# Patient Record
Sex: FEMALE | Race: WHITE | Employment: FULL TIME | URBAN - METROPOLITAN AREA
[De-identification: names, ages, dates, MRNs, and addresses within clinical notes are randomized per-mention and may not be internally consistent; named-entity substitution may affect disease eponyms.]

---

## 2021-06-30 ENCOUNTER — TELEPHONE (OUTPATIENT)
Dept: INTERNAL MEDICINE CLINIC | Age: 56
End: 2021-06-30

## 2021-06-30 NOTE — TELEPHONE ENCOUNTER
----- Message from Blessing Rich sent at 2021  1:32 PM EDT -----  Subject: Appointment Request    Reason for Call: New Patient Request Appointment    QUESTIONS  Type of Appointment? Established Patient  Reason for appointment request? No appointments available during search  Additional Information for Provider? pt would like to schedule a new   pt/physical with Dr. Josiane Strickland. Says that she met her and has spoken with her   before. 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 She wants   a physical for March. She had one this year in March. Had an appt for new   pt with Dr. Josiane Strickland in March and had to cancel. Please call/advise.   ---------------------------------------------------------------------------  --------------  CALL BACK INFO  What is the best way for the office to contact you? OK to leave message on   voicemail  Preferred Call Back Phone Number? [phone]  ---------------------------------------------------------------------------  --------------  SCRIPT ANSWERS  Relationship to Patient? Self  Appointment reason? Establish Care/Find a provider  Is this the first appointment to establish care for a ? No  Have you been diagnosed with, awaiting test results for, or told that you   are suspected of having COVID-19 (Coronavirus)? (If patient has tested   negative or was tested as a requirement for work, school, or travel and   not based on symptoms, answer no)? No  Do you currently have flu-like symptoms including fever or chills, cough,   shortness of breath, difficulty breathing, or new loss of taste or smell? No  Have you had close contact with someone with COVID-19 in the last 14 days? No  (Service Expert - click yes below to proceed with [employer] As Usual   Scheduling)?  Yes

## 2021-06-30 NOTE — TELEPHONE ENCOUNTER
Happy to see her. Okay to schedule her as a new patient for physical.  Recommend last week in March if I understood correctly, and she 1 scheduled for March. .  Will be scheduling a week off in one of the first 3 weeks in March but not sure which yet.

## 2021-08-02 ENCOUNTER — TELEPHONE (OUTPATIENT)
Dept: INTERNAL MEDICINE CLINIC | Age: 56
End: 2021-08-02

## 2021-08-02 NOTE — TELEPHONE ENCOUNTER
----- Message from William Campuzanoo sent at 2021  8:56 AM EDT -----  Subject: Appointment Request    Reason for Call: New Patient Request Appointment    QUESTIONS  Type of Appointment? New Patient/New to Provider  Reason for appointment request? No appointments available during search  Additional Information for Provider? Pt is scheduled as new pt on 3/2/22   with Dr Sourav Berger but would like to be seen sooner for some concerns. She   thinks she's having some metabolism issues, and has hypothyroidism and pre   diabetic. Please advise   ---------------------------------------------------------------------------  --------------  CALL BACK INFO  What is the best way for the office to contact you? OK to leave message on   voicemail  Preferred Call Back Phone Number? [phone]  ---------------------------------------------------------------------------  --------------  SCRIPT ANSWERS  Relationship to Patient? Self  Have your symptoms changed? No  Is this the first appointment to establish care for a ? No  Have you been diagnosed with, awaiting test results for, or told that you   are suspected of having COVID-19 (Coronavirus)? (If patient has tested   negative or was tested as a requirement for work, school, or travel and   not based on symptoms, answer no)? No  Do you currently have flu-like symptoms including fever or chills, cough,   shortness of breath, difficulty breathing, or new loss of taste or smell? No  Have you had close contact with someone with COVID-19 in the last 14 days? No  (Service Expert  click yes below to proceed with [employer] As Usual   Scheduling)?  Yes

## 2021-08-03 NOTE — TELEPHONE ENCOUNTER
LVM for patient to call back. I Have scheduled her for 10/11/21 @ 9am.  Please confirm if this time is good and then cancel the 3/2/22 appointment.

## 2021-08-27 LAB
AVERAGE GLUCOSE: NORMAL
HBA1C MFR BLD: 5.8 %

## 2021-09-13 ENCOUNTER — OFFICE VISIT (OUTPATIENT)
Dept: INTERNAL MEDICINE CLINIC | Age: 56
End: 2021-09-13
Payer: COMMERCIAL

## 2021-09-13 VITALS
DIASTOLIC BLOOD PRESSURE: 64 MMHG | BODY MASS INDEX: 24.98 KG/M2 | OXYGEN SATURATION: 99 % | SYSTOLIC BLOOD PRESSURE: 120 MMHG | HEART RATE: 71 BPM | HEIGHT: 63 IN | WEIGHT: 141 LBS

## 2021-09-13 DIAGNOSIS — R73.03 PREDIABETES: Primary | ICD-10-CM

## 2021-09-13 DIAGNOSIS — E78.00 PURE HYPERCHOLESTEROLEMIA: ICD-10-CM

## 2021-09-13 DIAGNOSIS — R63.5 WEIGHT INCREASED: ICD-10-CM

## 2021-09-13 DIAGNOSIS — E03.9 ACQUIRED HYPOTHYROIDISM: ICD-10-CM

## 2021-09-13 PROBLEM — E78.5 HYPERLIPIDEMIA: Status: ACTIVE | Noted: 2021-09-13

## 2021-09-13 PROCEDURE — 99203 OFFICE O/P NEW LOW 30 MIN: CPT | Performed by: INTERNAL MEDICINE

## 2021-09-13 RX ORDER — LEVOTHYROXINE SODIUM 0.07 MG/1
TABLET ORAL
COMMUNITY
Start: 2021-08-16 | End: 2022-03-11 | Stop reason: SDUPTHER

## 2021-09-13 RX ORDER — ATORVASTATIN CALCIUM 20 MG/1
20 TABLET, FILM COATED ORAL
COMMUNITY
Start: 2021-05-27

## 2021-09-13 SDOH — ECONOMIC STABILITY: FOOD INSECURITY: WITHIN THE PAST 12 MONTHS, THE FOOD YOU BOUGHT JUST DIDN'T LAST AND YOU DIDN'T HAVE MONEY TO GET MORE.: NEVER TRUE

## 2021-09-13 SDOH — ECONOMIC STABILITY: FOOD INSECURITY: WITHIN THE PAST 12 MONTHS, YOU WORRIED THAT YOUR FOOD WOULD RUN OUT BEFORE YOU GOT MONEY TO BUY MORE.: NEVER TRUE

## 2021-09-13 ASSESSMENT — PATIENT HEALTH QUESTIONNAIRE - PHQ9
SUM OF ALL RESPONSES TO PHQ QUESTIONS 1-9: 0
SUM OF ALL RESPONSES TO PHQ QUESTIONS 1-9: 0
1. LITTLE INTEREST OR PLEASURE IN DOING THINGS: 0
SUM OF ALL RESPONSES TO PHQ9 QUESTIONS 1 & 2: 0
2. FEELING DOWN, DEPRESSED OR HOPELESS: 0
SUM OF ALL RESPONSES TO PHQ QUESTIONS 1-9: 0

## 2021-09-13 ASSESSMENT — SOCIAL DETERMINANTS OF HEALTH (SDOH): HOW HARD IS IT FOR YOU TO PAY FOR THE VERY BASICS LIKE FOOD, HOUSING, MEDICAL CARE, AND HEATING?: NOT HARD AT ALL

## 2021-09-13 NOTE — PROGRESS NOTES
9/13/2021    Mayra Nye  YOB: 1965    ASSESSMENT/PLAN:   Prediabetes  Assessment & Plan:  Has decreased A1c since initially diagnosed. Continue to work on lifestyle modification. Weight increased  Comments:  Still with normal BMI. Feels overwhelmed at the varied recommendations on diet for different medical conditions. Advised she follow Mediterranean style diet,   Acquired hypothyroidism  Assessment & Plan:  Managed by endocrinology, Dr. Casie Yanez, at [facility]. Is been changed back to branded Synthroid. Pure hypercholesterolemia  Assessment & Plan: On atorvastatin primarily based on family history. Started by Dr. Ermelinda Damon a couple years ago. Advised that I could prescribe her Lipitor, but she works with him and plans to continue to follow with him. Future Appointments   Date Time Provider Clarence Black   9/14/2021  3:00 PM Alcide Duverney, RD, LD JULIET RICHARDS   3/11/2022  9:00 AM MD JULIET Carpio [facility] [place]                CC:   Chief Complaint   Patient presents with    New Patient       HPI: 60 y.o. female here today to establish care and assess status of chronic medical problems. Transferring care from Dr. Roldan Schmitz. [place] like he was jus t\"keeping an on on  Things. Concerned about weight. Pelaton bike, walks and [place]. Goal weight is 133, and naked weight is 139#. Sees endocrine, now at [facility] Dr. Casie Yanez. Back and forth between generic and branded Synthroid. Endocrine changing back to branded    Has Susan mckeon from   This year, diagnosed with possibly early macular degeneration    Prediabetes dx this year. Think maybe from statin. Dad with aggressive Parkinson's. Ended up with feeding tube and trach due to stridor. Has recent cardiac evaluation for decreased exercise endurance. Ended up having EKG, stress test and monitor with normal results. Then saw Dr. Ermelinda Damon for cholesterol and opted to treat due to family history.      Past Medical History: Diagnosis Date    Acquired hypothyroidism 3/6/2015    Hyperlipidemia 9/13/2021    Scoliosis-s/p kristen rods 11/16/2013       History reviewed. No pertinent surgical history. Family Hx:      Problem Relation Age of Onset    High Cholesterol Mother     Hypertension Mother     Colon Polyps Mother     Other Father         parkinsons    Thyroid Disease Father     Colon Cancer Paternal Grandfather        Social History     Tobacco Use    Smoking status: Never Smoker    Smokeless tobacco: Never Used   Substance Use Topics    Alcohol use: Yes     Comment: occas       Review of Systems  As documented in HPI and patient questionnaire (scanned)    Current Outpatient Medications   Medication Sig Dispense Refill    atorvastatin (LIPITOR) 20 MG tablet Take 20 mg by mouth daily      levothyroxine (SYNTHROID) 75 MCG tablet Take 1 and 1/2 tablets by mouth on Sunday and take 1 tablet Monday thru Saturday.  CALCIUM PO Take by mouth      Multiple Vitamins-Minerals (PRESERVISION AREDS PO) Take by mouth 2 times daily      ascorbic acid (VITAMIN C) 1000 MG tablet Take 1,000 mg by mouth daily      vitamin D 25 MCG (1000 UT) CAPS Take 1,000 Units by mouth daily       No current facility-administered medications for this visit. Physical Exam  Vitals:    09/13/21 1050   BP: 120/64   Pulse: 71   SpO2: 99%   Weight: 141 lb (64 kg)   Height: 5' 3\" (1.6 m)     Body mass index is 24.98 kg/m². Physical Exam  Constitutional:       Appearance: Normal appearance. She is well-developed. HENT:      Head: Normocephalic and atraumatic. Right Ear: Tympanic membrane and ear canal normal.      Left Ear: Tympanic membrane and ear canal normal.   Neck:      Thyroid: No thyromegaly. Vascular: No carotid bruit. Trachea: No tracheal deviation. Cardiovascular:      Rate and Rhythm: Normal rate and regular rhythm. Pulses:           Carotid pulses are 2+ on the right side and 2+ on the left side. Heart sounds: Normal heart sounds. No murmur heard. Pulmonary:      Effort: Pulmonary effort is normal.      Breath sounds: Normal breath sounds. No wheezing or rales. Abdominal:      General: Bowel sounds are normal. There is no distension. Palpations: Abdomen is soft. There is no mass. Tenderness: There is no abdominal tenderness. Musculoskeletal:      Right lower leg: No edema. Left lower leg: No edema. Lymphadenopathy:      Cervical: No cervical adenopathy. Skin:     General: Skin is warm and dry. Neurological:      General: No focal deficit present. Mental Status: She is alert. This note was generated completely or in part utilizing Dragon dictation speech recognition software. Occasionally, words are mistranscribed and despite editing, the text may contain inaccuracies due to incorrect word recognition.   If further clarification is needed please contact the office at [phone]    --Wong Simpson MD

## 2021-09-13 NOTE — ASSESSMENT & PLAN NOTE
Managed by endocrinology, Dr. Mara Mendez, at [facility]. Is been changed back to branded Synthroid.

## 2021-09-13 NOTE — ASSESSMENT & PLAN NOTE
On atorvastatin primarily based on family history. Started by Dr. Mariely Olea a couple years ago. Advised that I could prescribe her Lipitor, but she works with him and plans to continue to follow with him.

## 2021-09-14 ENCOUNTER — OFFICE VISIT (OUTPATIENT)
Dept: INTERNAL MEDICINE CLINIC | Age: 56
End: 2021-09-14

## 2021-09-14 DIAGNOSIS — R73.03 PREDIABETES: Primary | ICD-10-CM

## 2021-09-14 NOTE — PROGRESS NOTES
Medical Nutrition Therapy    Jazzy Valero  September 14, 2021      Reason for visit PreDiabetes, weight concerns, nutrition questions  Patient Care Team:  Patel Graves MD as PCP - General (Internal Medicine)  Patel Graves MD as PCP - [facility] Provider  Elliott Campos MD (Cardiology)  Abdulkadir Shoemaker MD (Endocrinology)  Buddy Garrison MD as Consulting Physician (Obstetrics & Gynecology)  Kacie Amos      ASSESSMENT/PLAN:   NUTRITION DIAGNOSIS    #1 Problem: Altered Nutrition-Related Laboratory Values (NC-2.2)  Related to: Endocrine/Diabetes   As Evidenced by: Elevated Plasma glucose and/or HgbA1c levels           NUTRITION INTERVENTION  Nutrition Prescription: Plan meals to follow Plate Guide, including 1/2 plate vegetables, 1/4 plate protein, and 1/4 plate starchy foods. Interventions:  Plate Planner  Mediterranean Diet Nutrition Tx (AND Nutrition Care Manual)    OUTCOME/INDICATORS TO EVALUATE:     Patient Instructions   BEHAVIOR GOALS    What to eat:   1) Plan meals to follow Plate Guide, with 1/2 plate vegetables, 1/4 plate protein, and 1/4 plate starch. 2) Include whole grains, fruit with edible skin and seeds, and yogurt    How much to eat: Pay attention to hunger - eat just to point of satisfaction. Physical Activity:Aim for activity on all or most days of the week, even if just a short bout.                        Patient Active Problem List   Diagnosis    Acquired hypothyroidism    Hyperlipidemia    Scoliosis-s/p rkisten rods    Prediabetes       Current Outpatient Medications   Medication Sig Dispense Refill    CALCIUM PO Take by mouth      Multiple Vitamins-Minerals (PRESERVISION AREDS PO) Take by mouth 2 times daily      ascorbic acid (VITAMIN C) 1000 MG tablet Take 1,000 mg by mouth daily      atorvastatin (LIPITOR) 20 MG tablet Take 20 mg by mouth daily      vitamin D 25 MCG (1000 UT) CAPS Take 1,000 Units by mouth daily      levothyroxine (SYNTHROID) 75 MCG tablet Take 1 and 1/2 tablets by mouth on Sunday and take 1 tablet Monday thru Saturday. No current facility-administered medications for this visit. NUTRITION ASSESSMENT    Biochemical Data, Medical Tests and Procedures: Recent A1c 5.8     Lab Results   Component Value Date    LABA1C 6.1 (H) 06/05/2020     No results found for: EAG    Lab Results   Component Value Date    CHOL 153 06/05/2020    CHOL 147 05/20/2019     Lab Results   Component Value Date    TRIG 72 06/05/2020    TRIG 52 05/20/2019     Lab Results   Component Value Date    HDL 67 06/05/2020    HDL 64 05/20/2019     Lab Results   Component Value Date    LDLCALC 72 06/05/2020    [address] 73 05/20/2019     No results found for: LABVLDL, VLDL  No results found for: CHOLHDLRATIO    No results found for: WBC, HGB, HCT, MCV, PLT  No results found for: CREATININE, BUN, NA, K, CL, CO2    Anthropometric Measurements: Wt Readings from Last 3 Encounters:   09/13/21 141 lb (64 kg)      BMI Readings from Last 3 Encounters:   09/13/21 24.98 kg/m²   weight down 6 lb since March - weighs self daily  Patient's stated goal weight: 133 lb  7% Weight loss goal weight: 131 lb    Physical Activity Assessment:  Physical activity: home work outs - walking, [employer] bike, barre (using oliverio)  Frequency of activity: 2 - 6 times per week  Duration of activity: 20 to 60 minutes  Obstacles to activity: doesn't go to gym since [place] - limited due to kristen bars    Sleep: not good quality - tossing and turning - 6 hours    Food and Nutrition History:   Number of people in household: 2 - pt does shopping and cooking  Frequency of Meals Eaten away from home:2/week on weekend  Food Availability Problems  Within the past 12 months, have you worried that your food would run out before you got money to buy more? No  Within the past 12 months, has the food you bought not lasted till the end of the month and you didn't have money to get more?  No  Beverage consumption: vitamin water, still water, waterloo drinks, diet coke, zevia  Alcohol consumption: 1 - 2 beers once on weekend  Usual Food consumption: trying to balance recommendations from endocrinologist, cardiologist, ophthalmologist    tends to indulge for emotional reasons - carpe bro  FOOD RECALL  Up at [facility] meal--Usual time: 8 - 8:30a  Today: [employer] protein drink on way to work   Different Day: egg bites with sausage (from [employer])  OR oatmeal with fruit - berries    Snack  Occasionally - yogurt or quest bar    Second meal--Usual time: 11:30 - noon  Recent: tuna, Kaye crackers  Different Day: salmon and broccolini, tomatoes, few square of potatoes  (leftovers from [employer])  Salad with chicken and fruit or cauliflower coated chicken nuggest    Snack  sometimes    Third meal--Usual time: 5 - 7p  Recent: chicago style hot dog; edamame  Different Day: salmon or chicken or flank steak; either as salad or sandwich; adds veg    Snack  Sometimes - quest peanut butter cups or piece of jenae bar         Follow Up: one month    Referring Provider: Shanika Armas MD  Time spent with patient: 60 minutes

## 2021-09-14 NOTE — PATIENT INSTRUCTIONS
BEHAVIOR GOALS    What to eat:   1) Plan meals to follow Plate Guide, with 1/2 plate vegetables, 1/4 plate protein, and 1/4 plate starch. 2) Include whole grains, fruit with edible skin and seeds, and yogurt    How much to eat: Pay attention to hunger - eat just to point of satisfaction. Physical Activity:Aim for activity on all or most days of the week, even if just a short bout.

## 2022-03-07 LAB
ANION GAP SERPL CALCULATED.3IONS-SCNC: 6 MMOL/L (ref 3–16)
BUN BLDV-MCNC: 20 MG/DL (ref 7–25)
CALCIUM SERPL-MCNC: 9.1 MG/DL (ref 8.6–10.3)
CHLORIDE BLD-SCNC: 107 MMOL/L (ref 98–110)
CHOLESTEROL, TOTAL: 171 MG/DL (ref 0–200)
CO2: 29 MMOL/L (ref 21–33)
CREAT SERPL-MCNC: 0.72 MG/DL (ref 0.6–1.3)
GFR, ESTIMATED: >90
GLUCOSE BLD-MCNC: 106 MG/DL (ref 70–100)
HBA1C MFR BLD: 5.6 % (ref 4–5.6)
HDLC SERPL-MCNC: 64 MG/DL (ref 60–92)
HEPATITIS C ANTIBODY: NONREACTIVE
LDL CHOLESTEROL CALCULATED: 88 MG/DL
OSMOLALITY CALCULATION: 297 MOSM/KG (ref 278–305)
POTASSIUM SERPL-SCNC: 4.2 MMOL/L (ref 3.5–5.3)
SODIUM BLD-SCNC: 142 MMOL/L (ref 133–146)
TRIGL SERPL-MCNC: 94 MG/DL (ref 10–149)
TSH, 3RD GENERATION: 2.05 UIU/ML (ref 0.45–4.12)

## 2022-03-08 ENCOUNTER — TELEPHONE (OUTPATIENT)
Dept: INTERNAL MEDICINE CLINIC | Age: 57
End: 2022-03-08

## 2022-03-08 NOTE — TELEPHONE ENCOUNTER
Let her know her vitamin D was good last year. Doesn't need to be checked yearly. CBC is not considered medically necessary for yearly preventive check, so didn't order to avoid her possibly being charged for it. Happy to add it on to specimen in lab if she would like. Let me know.

## 2022-03-08 NOTE — TELEPHONE ENCOUNTER
Patient states she was surprised that a CBC and Vitamin D were not included in the labs Dr. Denice Howard wanted her to get. She wants to know if this was an oversight, or does Dr. Denice Howard not want those at this time. Should Dr. Denice Howard want these labs done please generate an order. Please contact patient @ phone # provided with Dr. Vasquez Sensing reply.

## 2022-03-08 NOTE — TELEPHONE ENCOUNTER
Patient called back and was given Dr Cruz Art message she says she is fine with that lab  not being ordered. She will be here at her next appointment.

## 2022-03-10 NOTE — PROGRESS NOTES
3/11/2022    Jack Shearer  YOB: 1965    ASSESSMENT/PLAN:   Wellness examination  Discussed age appropriate preventive care including healthy diet, daily exercise, immunizations and age & gender guided screening tests. Acquired hypothyroidism  Stable, continue current dose levothyroxine. Return in about 1 year (around 3/11/2023) for shingrix #2 in 2-6 mo, CPE. CC:   Chief Complaint   Patient presents with    Annual Exam     Physical     HPI:   Doing well no new concerns. Still struggles to try and keep weight down. Exercise: [employer] classes, walks dog and has pelaton, but inconsistent with use. Diet: trying more for Mediterranean diet. Sleep: about 6 hours nightly     Thyroid has been very stable, has seen endocr[facility]ology at [facility] but would be interested in having me take over since I had offered before. Past Medical History:   Diagnosis Date    Acquired hypothyroidism 3/6/2015    Hyperlipidemia 9/13/2021    Scoliosis-s/p kristen rods 11/16/2013     History reviewed. No pertinent surgical history. Family Hx:      Problem Relation Age of Onset    High Cholesterol Mother     Hypertension Mother     Colon Polyps Mother     Other Mother         SVT    Other Father         parkinsons    Thyroid Disease Father     Colon Cancer Paternal Grandfather        Social History     Tobacco Use    Smoking status: Never Smoker    Smokeless tobacco: Never Used   Substance Use Topics    Alcohol use: Yes     Comment: occas       Review of Systems   Constitutional: Negative. HENT: Negative. Eyes: Negative. Respiratory: Negative. Cardiovascular: Negative. Gastrointestinal: Negative. Genitourinary: Negative. Skin: Negative. Neurological: Negative. Psychiatric/Behavioral: Negative.         Current Outpatient Medications   Medication Sig Dispense Refill    Multiple Vitamins-Minerals (PRESERVISION AREDS 2) CAPS Take by mouth 2 times daily      levothyroxine (SYNTHROID) 75 MCG tablet Take 1 and 1/2 tablets by mouth on Sunday and take 1 tablet Monday thru Saturday. 100 tablet 3    CALCIUM PO Take by mouth      ascorbic acid (VITAMIN C) 1000 MG tablet Take 1,000 mg by mouth daily      atorvastatin (LIPITOR) 20 MG tablet Take 20 mg by mouth four times a week      vitamin D 25 MCG (1000 UT) CAPS Take 1,000 Units by mouth daily       No current facility-administered medications for this visit. Physical Exam  Vitals:    03/11/22 0853   BP: 124/78   Pulse: 78   Resp: 12   SpO2: 99%   Weight: 143 lb (64.9 kg)     Body mass index is 25.33 kg/m². Physical Exam  Constitutional:       Appearance: Normal appearance. She is well-developed. HENT:      Right Ear: Tympanic membrane, ear canal and external ear normal.      Left Ear: Tympanic membrane, ear canal and external ear normal.   Eyes:      Conjunctiva/sclera: Conjunctivae normal.      Pupils: Pupils are equal, round, and reactive to light. Neck:      Thyroid: No thyromegaly. Vascular: No carotid bruit. Trachea: No tracheal deviation. Cardiovascular:      Rate and Rhythm: Normal rate and regular rhythm. Pulses: Normal pulses. Carotid pulses are 2+ on the right side and 2+ on the left side. Heart sounds: Normal heart sounds. No murmur heard. Pulmonary:      Effort: Pulmonary effort is normal.      Breath sounds: Normal breath sounds. No wheezing or rales. Abdominal:      General: Bowel sounds are normal. There is no distension. Palpations: Abdomen is soft. There is no mass. Tenderness: There is no abdominal tenderness. Musculoskeletal:      Cervical back: Normal range of motion and neck supple. Right lower leg: No edema. Left lower leg: No edema. Lymphadenopathy:      Cervical: No cervical adenopathy. Skin:     General: Skin is warm and dry. Coloration: Skin is not pale.       Comments: No suspicious skin lesions   Neurological:      General: No focal deficit present. Mental Status: She is alert. Psychiatric:         Mood and Affect: Mood normal.       This note was generated completely or in part utilizing Dragon dictation speech recognition software. Occasionally, words are mistranscribed and despite editing, the text may contain inaccuracies due to incorrect word recognition.   If further clarification is needed please contact the office at [phone]    --Rafael Shultz MD

## 2022-03-11 ENCOUNTER — OFFICE VISIT (OUTPATIENT)
Dept: INTERNAL MEDICINE CLINIC | Age: 57
End: 2022-03-11
Payer: COMMERCIAL

## 2022-03-11 VITALS
RESPIRATION RATE: 12 BRPM | BODY MASS INDEX: 25.33 KG/M2 | DIASTOLIC BLOOD PRESSURE: 78 MMHG | HEART RATE: 78 BPM | WEIGHT: 143 LBS | OXYGEN SATURATION: 99 % | SYSTOLIC BLOOD PRESSURE: 124 MMHG

## 2022-03-11 DIAGNOSIS — Z00.00 WELLNESS EXAMINATION: Primary | ICD-10-CM

## 2022-03-11 DIAGNOSIS — E03.9 ACQUIRED HYPOTHYROIDISM: ICD-10-CM

## 2022-03-11 PROCEDURE — 99396 PREV VISIT EST AGE 40-64: CPT | Performed by: INTERNAL MEDICINE

## 2022-03-11 PROCEDURE — 90471 IMMUNIZATION ADMIN: CPT | Performed by: INTERNAL MEDICINE

## 2022-03-11 PROCEDURE — 90750 HZV VACC RECOMBINANT IM: CPT | Performed by: INTERNAL MEDICINE

## 2022-03-11 RX ORDER — ANTIOX #8/OM3/DHA/EPA/LUT/ZEAX 250-2.5 MG
CAPSULE ORAL 2 TIMES DAILY
COMMUNITY
Start: 2021-03-11

## 2022-03-11 RX ORDER — LEVOTHYROXINE SODIUM 0.07 MG/1
TABLET ORAL
Qty: 100 TABLET | Refills: 3 | Status: SHIPPED | OUTPATIENT
Start: 2022-03-11

## 2022-03-11 ASSESSMENT — ENCOUNTER SYMPTOMS
RESPIRATORY NEGATIVE: 1
GASTROINTESTINAL NEGATIVE: 1
EYES NEGATIVE: 1

## 2022-03-11 NOTE — PATIENT INSTRUCTIONS
Work on [employer] For more detailed information, visit Nutrition Source web site- knowledge for healthy eating from [address] [place] [address]. [facility]    Patient Education        Recombinant Zoster (Shingles) Vaccine: What You Need to Know  Why get vaccinated? Recombinant zoster (shingles) vaccine can prevent shingles. Shingles (also called herpes zoster, or just zoster) is a painful skin rash, usually with blisters. In addition to the rash, shingles can cause fever, headache, chills, or upset stomach. More rarely, shingles can lead to pneumonia, hearing problems, blindness, brain inflammation (encephalitis), or death. The most common complication of shingles is long-term nerve pain called postherpetic neuralgia (PHN). PHN occurs in the areas where the shingles rash was, even after the rash clears up. It can last for months or years after the rash goes away. The pain from PHN can be severe and debilitating. About 10 to 18% of people who get shingles will experience PHN. The risk of PHN increases with age. An older adult with shingles is more likely to develop PHN and have longer lasting and more severe pain than a younger person with shingles. Shingles is caused by the varicella zoster virus, the same virus that causes chickenpox. After you have chickenpox, the virus stays in your body and can cause shingles later in life. Shingles cannot be passed from one person to another, but the virus that causes shingles can spread and cause chickenpox in someone who had never had chickenpox or received chickenpox vaccine. Recombinant shingles vaccine  Recombinant shingles vaccine provides strong protection against shingles. By preventing shingles, recombinant shingles vaccine also protects against PHN. Recombinant shingles vaccine is the preferred vaccine for the prevention of shingles. However, a different vaccine, live shingles vaccine, may be used in some circumstances.   The recombinant shingles vaccine is recommended for adults 50 years and older without serious immune problems. It is given as a two-dose series. This vaccine is also recommended for people who have already gotten another type of shingles vaccine, the live shingles vaccine. There is no live virus in this vaccine. Shingles vaccine may be given at the same time as other vaccines. Talk with your health care provider  Tell your vaccine provider if the person getting the vaccine:  · Has had an allergic reaction after a previous dose of recombinant shingles vaccine, or has any severe, life-threatening allergies. · Is pregnant or breastfeeding. · Is currently experiencing an episode of shingles. In some cases, your health care provider may decide to postpone shingles vaccination to a future visit. People with minor illnesses, such as a cold, may be vaccinated. People who are moderately or severely ill should usually wait until they recover before getting recombinant shingles vaccine. Your health care provider can give you more information. Risks of a vaccine reaction  · A sore arm with mild or moderate pain is very common after recombinant shingles vaccine, affecting about 80% of vaccinated people. Redness and swelling can also happen at the site of the injection. · Tiredness, muscle pain, headache, shivering, fever, stomach pain, and nausea happen after vaccination in more than half of people who receive recombinant shingles vaccine. In clinical trials, about 1 out of 6 people who got recombinant zoster vaccine experienced side effects that prevented them from doing regular activities. Symptoms usually went away on their own in 2 to 3 days. You should still get the second dose of recombinant zoster vaccine even if you had one of these reactions after the first dose. People sometimes faint after medical procedures, including vaccination. Tell your provider if you feel dizzy or have vision changes or ringing in the ears.   As with any medicine, there is a very remote chance of a vaccine causing a severe allergic reaction, other serious injury, or death. What if there is a serious problem? An allergic reaction could occur after the vaccinated person leaves the clinic. If you see signs of a severe allergic reaction (hives, swelling of the face and throat, difficulty breathing, a fast heartbeat, dizziness, or weakness), call 9-1-1 and get the person to the nearest hospital.  For other signs that concern you, call your health care provider. Adverse reactions should be reported to the Vaccine Adverse Event Reporting System (VAERS). Your health care provider will usually file this report, or you can do it yourself. Visit the VAERS website at www.vaers. [facility].gov or call 3-924.831.8935. VAERS is only for reporting reactions, and VAERS staff do not give medical advice. How can I learn more? · Ask your health care provider. · Call your local or state health department. · Contact the Centers for Disease Control and Prevention (CDC):  ? Call 3-569.173.8334 (1-800-CDC-INFO) or  ? Visit CDC's website at www.cdc.gov/vaccines  Vaccine Information Statement  Recombinant Zoster Vaccine  10/30/2019  Department of [facility] and [employer] for Disease Control and Prevention  Many Vaccine Information Statements are available in Romansh and other languages. See www.immunize.org/vis. Hojas de Información Sobre Vacunas están disponibles en Español y en muchos otros idiomas. Visite Viki.si   Care instructions adapted under license by [facility] ([facility]). If you have questions about a medical condition or this instruction, always ask your healthcare professional. [place] [address] any warranty or liability for your use of this information.

## 2022-06-16 LAB — PAP SMEAR, EXTERNAL: NEGATIVE

## 2022-07-14 ENCOUNTER — NURSE ONLY (OUTPATIENT)
Dept: INTERNAL MEDICINE CLINIC | Age: 57
End: 2022-07-14
Payer: COMMERCIAL

## 2022-07-14 DIAGNOSIS — Z23 NEED FOR VACCINATION FOR ZOSTER: Primary | ICD-10-CM

## 2022-07-14 PROCEDURE — 90471 IMMUNIZATION ADMIN: CPT | Performed by: INTERNAL MEDICINE

## 2022-07-14 PROCEDURE — 90750 HZV VACC RECOMBINANT IM: CPT | Performed by: INTERNAL MEDICINE

## 2023-02-27 ENCOUNTER — TELEPHONE (OUTPATIENT)
Dept: INTERNAL MEDICINE CLINIC | Age: 58
End: 2023-02-27

## 2023-02-27 DIAGNOSIS — E03.9 ACQUIRED HYPOTHYROIDISM: Primary | ICD-10-CM

## 2023-02-27 DIAGNOSIS — Z13.1 SCREENING FOR DIABETES MELLITUS: ICD-10-CM

## 2023-02-27 DIAGNOSIS — E78.00 PURE HYPERCHOLESTEROLEMIA: ICD-10-CM

## 2023-02-27 NOTE — TELEPHONE ENCOUNTER
They'll be ready for hier to  tomorrow.   (I'll sign when I'm at office tomorrow so will print there)

## 2023-02-27 NOTE — TELEPHONE ENCOUNTER
Patient is requesting her blood work orders be placed and printed prior to her Physical on 3/17. Patient would like to pick these up when possible so she can have the labs completed at her job. Please contact patient when orders are ready to be picked up.

## 2023-03-06 LAB
ANION GAP SERPL CALCULATED.3IONS-SCNC: 7 MMOL/L (ref 3–16)
BUN BLDV-MCNC: 20 MG/DL (ref 7–25)
CALCIUM SERPL-MCNC: 9.9 MG/DL (ref 8.6–10.3)
CHLORIDE BLD-SCNC: 106 MMOL/L (ref 98–110)
CHOLESTEROL, TOTAL: 154 MG/DL (ref 0–200)
CO2: 29 MMOL/L (ref 21–33)
CREAT SERPL-MCNC: 0.88 MG/DL (ref 0.6–1.3)
GFR, ESTIMATED: 76
GLUCOSE BLD-MCNC: 103 MG/DL (ref 70–100)
HBA1C MFR BLD: 5.6 % (ref 4–5.6)
HDLC SERPL-MCNC: 63 MG/DL (ref 60–92)
LDL CHOLESTEROL CALCULATED: 73 MG/DL
NON-HDL CHOLESTEROL: 91 MG/DL (ref 0–129)
OSMOLALITY CALCULATION: 297 MOSM/KG (ref 278–305)
POTASSIUM SERPL-SCNC: 3.9 MMOL/L (ref 3.5–5.3)
SODIUM BLD-SCNC: 142 MMOL/L (ref 133–146)
TRIGL SERPL-MCNC: 88 MG/DL (ref 10–149)
TSH, 3RD GENERATION: 1.78 UIU/ML (ref 0.45–4.12)

## 2023-03-16 ASSESSMENT — ENCOUNTER SYMPTOMS
GASTROINTESTINAL NEGATIVE: 1
EYES NEGATIVE: 1
RESPIRATORY NEGATIVE: 1

## 2023-03-17 ENCOUNTER — OFFICE VISIT (OUTPATIENT)
Dept: INTERNAL MEDICINE CLINIC | Age: 58
End: 2023-03-17
Payer: COMMERCIAL

## 2023-03-17 VITALS
SYSTOLIC BLOOD PRESSURE: 110 MMHG | BODY MASS INDEX: 24.59 KG/M2 | HEART RATE: 77 BPM | WEIGHT: 138.8 LBS | OXYGEN SATURATION: 98 % | DIASTOLIC BLOOD PRESSURE: 60 MMHG | HEIGHT: 63 IN

## 2023-03-17 DIAGNOSIS — Z13.220 LIPID SCREENING: ICD-10-CM

## 2023-03-17 DIAGNOSIS — E03.9 ACQUIRED HYPOTHYROIDISM: ICD-10-CM

## 2023-03-17 DIAGNOSIS — Z00.00 WELL ADULT EXAM: Primary | ICD-10-CM

## 2023-03-17 DIAGNOSIS — Z13.1 SCREENING FOR DIABETES MELLITUS: ICD-10-CM

## 2023-03-17 PROCEDURE — 99396 PREV VISIT EST AGE 40-64: CPT | Performed by: INTERNAL MEDICINE

## 2023-03-17 RX ORDER — LEVOTHYROXINE SODIUM 0.07 MG/1
TABLET ORAL
Qty: 100 TABLET | Refills: 3 | Status: SHIPPED | OUTPATIENT
Start: 2023-03-17

## 2023-03-17 SDOH — ECONOMIC STABILITY: FOOD INSECURITY: WITHIN THE PAST 12 MONTHS, THE FOOD YOU BOUGHT JUST DIDN'T LAST AND YOU DIDN'T HAVE MONEY TO GET MORE.: NEVER TRUE

## 2023-03-17 SDOH — ECONOMIC STABILITY: FOOD INSECURITY: WITHIN THE PAST 12 MONTHS, YOU WORRIED THAT YOUR FOOD WOULD RUN OUT BEFORE YOU GOT MONEY TO BUY MORE.: NEVER TRUE

## 2023-03-17 SDOH — ECONOMIC STABILITY: INCOME INSECURITY: HOW HARD IS IT FOR YOU TO PAY FOR THE VERY BASICS LIKE FOOD, HOUSING, MEDICAL CARE, AND HEATING?: NOT HARD AT ALL

## 2023-03-17 SDOH — ECONOMIC STABILITY: HOUSING INSECURITY
IN THE LAST 12 MONTHS, WAS THERE A TIME WHEN YOU DID NOT HAVE A STEADY PLACE TO SLEEP OR SLEPT IN A SHELTER (INCLUDING NOW)?: NO

## 2023-03-17 ASSESSMENT — PATIENT HEALTH QUESTIONNAIRE - PHQ9
SUM OF ALL RESPONSES TO PHQ QUESTIONS 1-9: 0
2. FEELING DOWN, DEPRESSED OR HOPELESS: 0
SUM OF ALL RESPONSES TO PHQ QUESTIONS 1-9: 0
1. LITTLE INTEREST OR PLEASURE IN DOING THINGS: 0
SUM OF ALL RESPONSES TO PHQ QUESTIONS 1-9: 0
SUM OF ALL RESPONSES TO PHQ QUESTIONS 1-9: 0
SUM OF ALL RESPONSES TO PHQ9 QUESTIONS 1 & 2: 0

## 2023-03-17 NOTE — PROGRESS NOTES
3/17/2023    Augusto Barton  YOB: 1965    ASSESSMENT/PLAN:   Wellness examination  Discussed age appropriate preventive care including healthy diet, daily exercise, immunizations and age & gender guided screening tests. Due for colonoscopy. Patient will schedule. Acquired hypothyroidism  Stable, continue current dose levothyroxine. Return in about 1 year (around 3/17/2024) for CPE. CC:   Chief Complaint   Patient presents with    Annual Exam     HPI:   Doing well no new concerns. Still struggles to try and keep weight down. Exercise: [employer] classes, walks  Diet: Healthy diet    Thyroid has been very stable. Right thumb nail grows in with ridge, weaker. Dog bit it years ago. Past Medical History:   Diagnosis Date    Acquired hypothyroidism 3/6/2015    Hyperlipidemia 9/13/2021    Scoliosis-s/p kristen rods 11/16/2013     History reviewed. No pertinent surgical history. Family Hx:      Problem Relation Age of Onset    High Cholesterol Mother     Hypertension Mother     Colon Polyps Mother     Other Mother         SVT    Other Father         parkinsons    Thyroid Disease Father     Colon Cancer Paternal Grandfather        Social History     Tobacco Use    Smoking status: Never    Smokeless tobacco: Never   Substance Use Topics    Alcohol use: Yes     Comment: occas       Review of Systems   Constitutional: Negative. HENT: Negative. Eyes: Negative. Respiratory: Negative. Cardiovascular: Negative. Gastrointestinal: Negative. Genitourinary: Negative. Skin: Negative. Neurological: Negative. Psychiatric/Behavioral: Negative. Current Outpatient Medications   Medication Sig Dispense Refill    levothyroxine (SYNTHROID) 75 MCG tablet Take 1 and 1/2 tablets by mouth on Sunday and take 1 tablet Monday thru Saturday.  100 tablet 3    Multiple Vitamins-Minerals (PRESERVISION AREDS 2) CAPS Take by mouth 2 times daily      CALCIUM PO Take by mouth atorvastatin (LIPITOR) 20 MG tablet Take 20 mg by mouth four times a week      vitamin D 25 MCG (1000 UT) CAPS Take 1,000 Units by mouth daily       No current facility-administered medications for this visit. Physical Exam  Vitals:    03/17/23 0856   BP: 110/60   Site: Right Upper Arm   Position: Sitting   Cuff Size: Medium Adult   Pulse: 77   SpO2: 98%   Weight: 138 lb 12.8 oz (63 kg)   Height: 5' 3\" (1.6 m)     Body mass index is 24.59 kg/m². Physical Exam  Constitutional:       Appearance: Normal appearance. She is well-developed. HENT:      Right Ear: Tympanic membrane, ear canal and external ear normal.      Left Ear: Tympanic membrane, ear canal and external ear normal.   Eyes:      Conjunctiva/sclera: Conjunctivae normal.      Pupils: Pupils are equal, round, and reactive to light. Neck:      Thyroid: No thyromegaly. Vascular: No carotid bruit. Trachea: No tracheal deviation. Cardiovascular:      Rate and Rhythm: Normal rate and regular rhythm. Pulses: Normal pulses. Carotid pulses are 2+ on the right side and 2+ on the left side. Heart sounds: Normal heart sounds. No murmur heard. Pulmonary:      Effort: Pulmonary effort is normal.      Breath sounds: Normal breath sounds. No wheezing or rales. Abdominal:      General: Bowel sounds are normal. There is no distension. Palpations: Abdomen is soft. There is no mass. Tenderness: There is no abdominal tenderness. Musculoskeletal:      Cervical back: Normal range of motion and neck supple. Right lower leg: No edema. Left lower leg: No edema. Lymphadenopathy:      Cervical: No cervical adenopathy. Skin:     General: Skin is warm and dry. Coloration: Skin is not pale. Comments: No suspicious skin lesions   Neurological:      General: No focal deficit present. Mental Status: She is alert.    Psychiatric:         Mood and Affect: Mood normal.     This note was generated completely or in part utilizing Dragon dictation speech recognition software. Occasionally, words are mistranscribed and despite editing, the text may contain inaccuracies due to incorrect word recognition.   If further clarification is needed please contact the office at [phone]    --Tamara Tidwell MD

## 2023-03-17 NOTE — PATIENT INSTRUCTIONS
Recommendations for optimal health:  Be sure you are exercising at least 30 minutes  or 10,000 steps daily. Ideally you should try to get a mix of cardio and strength exercises. Work on [employer] For more detailed information, visit Nutrition Source web site- knowledge for healthy eating from [address] [place] [address]. [facility]      If your are using supplements, look for \"USP verified\" on the label. Helps to assure they are good quality. Vitamin D 800 units daily. Calcium intake - try for 800-1200 mg of calcium in combination of diet and supplements. You can read on this in much more detail on nutritionPulmologixe. org    8 Nutrition Tips for Eating Right:  1. Choose good carbs, not no carbs. Whole grains are your best bet. 2. Pay attention to the protein package. Fish, poultry, nuts, and beans are the best choices. 3. Choose foods with healthy fats, limit foods high in saturated fat, and avoid foods with trans fat. Plant oils, nuts, and fish are the healthiest sources. 4. Choose a fiber-filled diet, rich in whole grains, vegetables, and fruits. 5. Eat more vegetables and fruits. Go for color and variety--dark green, yellow, orange, and red. 6. Calcium is important. But milk isnt the only, or even best, source. 7. Water is best to quench your thirst. Skip the sugary drinks, and go easy on the milk and juice. 8. Eating less salt is good for everyones health. Choose more fresh foods and fewer processed foods. Aim for 2-3 cups of vegetables daily and 1 1/2-2 cups of fruits daily.

## 2023-10-17 ENCOUNTER — OFFICE VISIT (OUTPATIENT)
Dept: INTERNAL MEDICINE CLINIC | Age: 58
End: 2023-10-17
Payer: COMMERCIAL

## 2023-10-17 VITALS
DIASTOLIC BLOOD PRESSURE: 64 MMHG | HEART RATE: 76 BPM | SYSTOLIC BLOOD PRESSURE: 122 MMHG | OXYGEN SATURATION: 97 % | BODY MASS INDEX: 25.44 KG/M2 | WEIGHT: 143.6 LBS | TEMPERATURE: 98 F

## 2023-10-17 DIAGNOSIS — H81.10 BENIGN PAROXYSMAL POSITIONAL VERTIGO, UNSPECIFIED LATERALITY: ICD-10-CM

## 2023-10-17 DIAGNOSIS — M54.2 NECK PAIN ON RIGHT SIDE: Primary | ICD-10-CM

## 2023-10-17 DIAGNOSIS — F43.21 ADJUSTMENT DISORDER WITH DEPRESSED MOOD: ICD-10-CM

## 2023-10-17 PROCEDURE — 99214 OFFICE O/P EST MOD 30 MIN: CPT | Performed by: INTERNAL MEDICINE

## 2023-10-17 NOTE — PATIENT INSTRUCTIONS
Physical Therapy Facilities:  [facility]  [phone] ([facility] [phone] ([place])    [facility] Maile Sanchez)  [phone]  [facility]) [facility] [address] KEVIN HWANG)  [phone]    [facility] -[address] ([address])  Mariely Aguirre  [phone]      [place] H[place] [phone]    [facility]  ([facility])  [phone]  [facility]- moving

## 2023-10-18 ENCOUNTER — PATIENT MESSAGE (OUTPATIENT)
Dept: INTERNAL MEDICINE CLINIC | Age: 58
End: 2023-10-18

## 2024-02-24 ENCOUNTER — PATIENT MESSAGE (OUTPATIENT)
Dept: INTERNAL MEDICINE CLINIC | Age: 59
End: 2024-02-24

## 2024-02-24 DIAGNOSIS — Z13.1 SCREENING FOR DIABETES MELLITUS: ICD-10-CM

## 2024-02-24 DIAGNOSIS — E03.9 ACQUIRED HYPOTHYROIDISM: ICD-10-CM

## 2024-02-24 DIAGNOSIS — Z00.00 WELL ADULT EXAM: ICD-10-CM

## 2024-02-24 DIAGNOSIS — Z13.220 LIPID SCREENING: Primary | ICD-10-CM

## 2024-03-13 LAB
ALBUMIN SERPL-MCNC: 3.7 G/DL (ref 3.5–5.7)
ALP BLD-CCNC: 37 U/L (ref 36–125)
ALT SERPL-CCNC: 16 U/L (ref 7–52)
ANION GAP SERPL CALCULATED.3IONS-SCNC: 6 MMOL/L (ref 3–16)
AST SERPL-CCNC: 15 U/L (ref 13–39)
BILIRUB SERPL-MCNC: 0.7 MG/DL (ref 0–1.5)
BUN BLDV-MCNC: 18 MG/DL (ref 7–25)
CALCIUM SERPL-MCNC: 9.5 MG/DL (ref 8.6–10.3)
CHLORIDE BLD-SCNC: 107 MMOL/L (ref 98–110)
CHOLESTEROL, TOTAL: 183 MG/DL (ref 0–200)
CO2: 30 MMOL/L (ref 21–33)
CREAT SERPL-MCNC: 0.76 MG/DL (ref 0.6–1.3)
ESTIMATED GLOMERULAR FILTRATION RATE CREATININE EQUATION: 90
GLUCOSE BLD-MCNC: 115 MG/DL (ref 70–100)
HBA1C MFR BLD: 5.7 % (ref 4–5.6)
HCT VFR BLD CALC: 39.4 % (ref 35–45)
HDLC SERPL-MCNC: 64 MG/DL (ref 60–92)
HEMOGLOBIN: 13.5 G/DL (ref 11.7–15.5)
LDL CHOLESTEROL CALCULATED: 103 MG/DL
MCH RBC QN AUTO: 29.7 PG (ref 27–33)
MCHC RBC AUTO-ENTMCNC: 34.3 G/DL (ref 32–36)
MCV RBC AUTO: 86.7 FL (ref 80–100)
NON-HDL CHOLESTEROL: 119 MG/DL (ref 0–129)
OSMOLALITY CALCULATION: 299 MOSM/KG (ref 278–305)
PDW BLD-RTO: 13.8 % (ref 11–15)
PLATELET # BLD: 211 10E3/UL (ref 140–400)
PMV BLD AUTO: 9.9 FL (ref 7.5–11.5)
POTASSIUM SERPL-SCNC: 4.4 MMOL/L (ref 3.5–5.3)
RBC # BLD: 4.54 10E6/UL (ref 3.8–5.1)
SODIUM BLD-SCNC: 143 MMOL/L (ref 133–146)
TOTAL PROTEIN: 6.2 G/DL (ref 6.4–8.9)
TRIGL SERPL-MCNC: 81 MG/DL (ref 10–149)
TSH, 3RD GENERATION: 2.16 UIU/ML (ref 0.45–4.12)
WBC # BLD: 4.5 10E3/UL (ref 3.8–10.8)

## 2024-03-21 ASSESSMENT — ENCOUNTER SYMPTOMS
GASTROINTESTINAL NEGATIVE: 1
RESPIRATORY NEGATIVE: 1
EYES NEGATIVE: 1

## 2024-03-22 ENCOUNTER — OFFICE VISIT (OUTPATIENT)
Dept: INTERNAL MEDICINE CLINIC | Age: 59
End: 2024-03-22
Payer: COMMERCIAL

## 2024-03-22 VITALS
HEIGHT: 63 IN | BODY MASS INDEX: 24.27 KG/M2 | WEIGHT: 137 LBS | SYSTOLIC BLOOD PRESSURE: 116 MMHG | OXYGEN SATURATION: 99 % | HEART RATE: 79 BPM | DIASTOLIC BLOOD PRESSURE: 68 MMHG

## 2024-03-22 DIAGNOSIS — Z12.11 COLON CANCER SCREENING: ICD-10-CM

## 2024-03-22 DIAGNOSIS — Z00.00 WELL ADULT EXAM: Primary | ICD-10-CM

## 2024-03-22 DIAGNOSIS — E03.9 ACQUIRED HYPOTHYROIDISM: ICD-10-CM

## 2024-03-22 DIAGNOSIS — E78.00 PURE HYPERCHOLESTEROLEMIA: ICD-10-CM

## 2024-03-22 PROCEDURE — 99396 PREV VISIT EST AGE 40-64: CPT | Performed by: INTERNAL MEDICINE

## 2024-03-22 PROCEDURE — 90715 TDAP VACCINE 7 YRS/> IM: CPT | Performed by: INTERNAL MEDICINE

## 2024-03-22 PROCEDURE — 90471 IMMUNIZATION ADMIN: CPT | Performed by: INTERNAL MEDICINE

## 2024-03-22 RX ORDER — LEVOTHYROXINE SODIUM 88 UG/1
88 TABLET ORAL DAILY
Qty: 90 TABLET | Refills: 1 | Status: SHIPPED | OUTPATIENT
Start: 2024-03-22

## 2024-03-22 RX ORDER — ROSUVASTATIN CALCIUM 10 MG/1
10 TABLET, COATED ORAL DAILY
COMMUNITY
Start: 2024-03-14

## 2024-03-22 RX ORDER — BLOOD-GLUCOSE SENSOR
EACH MISCELLANEOUS
Qty: 2 EACH | Refills: 11 | Status: SHIPPED | OUTPATIENT
Start: 2024-03-22

## 2024-03-22 RX ORDER — LEVOTHYROXINE SODIUM 88 UG/1
TABLET ORAL
Qty: 90 TABLET | Refills: 1 | Status: SHIPPED | OUTPATIENT
Start: 2024-03-22 | End: 2024-03-22

## 2024-03-22 ASSESSMENT — PATIENT HEALTH QUESTIONNAIRE - PHQ9
2. FEELING DOWN, DEPRESSED OR HOPELESS: NOT AT ALL
SUM OF ALL RESPONSES TO PHQ QUESTIONS 1-9: 0
1. LITTLE INTEREST OR PLEASURE IN DOING THINGS: NOT AT ALL
SUM OF ALL RESPONSES TO PHQ9 QUESTIONS 1 & 2: 0
SUM OF ALL RESPONSES TO PHQ QUESTIONS 1-9: 0

## 2024-03-22 NOTE — ASSESSMENT & PLAN NOTE
Asymptomatic other than continued issues with maintaining weight.  TSH slightly increased.  Adjust levothyroxine dose, from 75 mcg 6 days a week, 150 one day/ week 2 levothyroxine 88 mcg daily.

## 2024-03-22 NOTE — PROGRESS NOTES
range of motion and neck supple.      Right lower leg: No edema.      Left lower leg: No edema.   Lymphadenopathy:      Cervical: No cervical adenopathy.   Skin:     General: Skin is warm and dry.      Coloration: Skin is not pale.      Comments: No suspicious skin lesions   Neurological:      General: No focal deficit present.      Mental Status: She is alert.   Psychiatric:         Mood and Affect: Mood normal.       This note was generated completely or in part utilizing Dragon dictation speech recognition software.  Occasionally, words are mistranscribed and despite editing, the text may contain inaccuracies due to incorrect word recognition.  If further clarification is needed please contact the office at [phone]    --Kaitlin Conde MD

## 2024-04-18 ENCOUNTER — TELEPHONE (OUTPATIENT)
Dept: INTERNAL MEDICINE CLINIC | Age: 59
End: 2024-04-18

## 2024-04-18 NOTE — TELEPHONE ENCOUNTER
----- Message from Giuliana Rizzo sent at 4/18/2024 10:09 AM EDT -----  Regarding: [facility] Appointment Request  [facility] Appointment Request    Patient needs appointment for [facility] Appointment Type: New to Provider.    Reason for Appointment Request: No appointments available during search. Pt wants to establish care with Maylin Abebe. And asking if she is accepting new patients.  --------------------------------------------------------------------------------------------------------------------------    Relationship to Patient: Self     Call Back Information: OK to respond with electronic message via [employer] portal (only for patients who have registered [employer] account)    Preferred Call Back Number: Phone [phone] (mobile)

## 2024-04-18 NOTE — TELEPHONE ENCOUNTER
Patient advised that Dr. Abebe is not able to take on any more new patients. Advised that Dr. Sheldon is booked out to Late December. Patient is wanting a female. Advised her to refernce the letter for additional options

## 2024-06-07 ENCOUNTER — TELEPHONE (OUTPATIENT)
Dept: INTERNAL MEDICINE CLINIC | Age: 59
End: 2024-06-07

## 2024-06-07 NOTE — TELEPHONE ENCOUNTER
Pt came into office, stating that Dr. Ledesma spoke with Dr. Abebe yesterday (06/07/2024) about the pt (Britni Salmon) establishing care as a new patient. Pt sated that Dr. Abebe agree to take the pt on as a new patient. Upon speaking with Vernell she aware me that the pt was denied to be brought on as a NP. I called the pt after she left, to inform her about the letter sent out from Marcie about being denied. The pt stated that letter was old. She was okayed by Dr. Abebe per Dr. Ledesma.       Britni's Callback # [phone]    Please advise...

## 2024-09-01 SDOH — HEALTH STABILITY: PHYSICAL HEALTH: ON AVERAGE, HOW MANY MINUTES DO YOU ENGAGE IN EXERCISE AT THIS LEVEL?: 30 MIN

## 2024-09-01 SDOH — HEALTH STABILITY: PHYSICAL HEALTH: ON AVERAGE, HOW MANY DAYS PER WEEK DO YOU ENGAGE IN MODERATE TO STRENUOUS EXERCISE (LIKE A BRISK WALK)?: 2 DAYS

## 2024-09-04 ENCOUNTER — OFFICE VISIT (OUTPATIENT)
Dept: INTERNAL MEDICINE CLINIC | Age: 59
End: 2024-09-04
Payer: COMMERCIAL

## 2024-09-04 VITALS
DIASTOLIC BLOOD PRESSURE: 78 MMHG | WEIGHT: 141 LBS | HEART RATE: 67 BPM | BODY MASS INDEX: 25.18 KG/M2 | OXYGEN SATURATION: 98 % | SYSTOLIC BLOOD PRESSURE: 128 MMHG

## 2024-09-04 DIAGNOSIS — E78.00 PURE HYPERCHOLESTEROLEMIA: Primary | ICD-10-CM

## 2024-09-04 DIAGNOSIS — E03.9 ACQUIRED HYPOTHYROIDISM: ICD-10-CM

## 2024-09-04 PROCEDURE — 99214 OFFICE O/P EST MOD 30 MIN: CPT | Performed by: INTERNAL MEDICINE

## 2024-09-04 RX ORDER — LEVOTHYROXINE SODIUM 88 UG/1
88 TABLET ORAL DAILY
Qty: 90 TABLET | Refills: 1 | Status: SHIPPED | OUTPATIENT
Start: 2024-09-04

## 2024-09-04 SDOH — ECONOMIC STABILITY: FOOD INSECURITY: WITHIN THE PAST 12 MONTHS, THE FOOD YOU BOUGHT JUST DIDN'T LAST AND YOU DIDN'T HAVE MONEY TO GET MORE.: NEVER TRUE

## 2024-09-04 SDOH — ECONOMIC STABILITY: FOOD INSECURITY: WITHIN THE PAST 12 MONTHS, YOU WORRIED THAT YOUR FOOD WOULD RUN OUT BEFORE YOU GOT MONEY TO BUY MORE.: NEVER TRUE

## 2024-09-04 SDOH — ECONOMIC STABILITY: INCOME INSECURITY: HOW HARD IS IT FOR YOU TO PAY FOR THE VERY BASICS LIKE FOOD, HOUSING, MEDICAL CARE, AND HEATING?: NOT HARD AT ALL

## 2024-09-04 NOTE — PROGRESS NOTES
further clarification is needed please contact the office at [phone]        An electronic signature was used to authenticate this note.    --ZAMZAM SALDIVAR MD

## 2024-09-04 NOTE — PATIENT INSTRUCTIONS
Consider dr Jameson Strickland for C spine    Ct cardiac  [phone] ([facility]) or [facility]    Send me December labs

## 2025-01-08 NOTE — PROGRESS NOTES
Britni Salmon (:  1965) is a 59 y.o. female, here for evaluation of the following chief complaint(s):    Abnormal Test Results (Dexa scan)      ASSESSMENT/PLAN:  1. Localized osteoporosis, unspecified pathological fracture presence  Advised PTH lab to complete the workup.  Other labs reviewed and are noncontributory.  Discussed that we would decide next steps after lab returns.  Would likely recommend alendronate due to progression in bone density decline.  List provided of foods high in calcium.    Return if symptoms worsen or fail to improve.    SUBJECTIVE/OBJECTIVE:  HPI  Patient is here to follow-up recent DEXA.  She was noted to have forearm osteoporosis, progressed since her last DEXA scan.  Her hip osteopenia also progressed since last DEXA scan.  Lumbar score is not provided due to having Urena rods for scoliosis.    Patient would like my opinion on whether or not she should start on osteoporosis therapy.    Review of Systems    Past Medical History:   Diagnosis Date    Acquired hypothyroidism 2015    Degenerative disc disease, cervical     Degenerative lumbar disc     Hyperlipidemia 2021    Scoliosis-s/p kristen rods 2013       Current Outpatient Medications   Medication Sig Dispense Refill    levothyroxine (SYNTHROID) 88 MCG tablet Take 1 tablet by mouth Daily 90 tablet 1    rosuvastatin (CRESTOR) 10 MG tablet Take 1 tablet by mouth daily      Multiple Vitamins-Minerals (PRESERVISION AREDS 2) CAPS Take by mouth 2 times daily      CALCIUM PO Take by mouth      vitamin D 25 MCG (1000 UT) CAPS Take 1 capsule by mouth daily       No current facility-administered medications for this visit.       Physical Exam  Vitals reviewed.   Constitutional:       General: She is not in acute distress.  Neurological:      Mental Status: She is alert and oriented to person, place, and time.   Psychiatric:         Mood and Affect: Mood normal.               This note was generated

## 2025-01-09 ENCOUNTER — OFFICE VISIT (OUTPATIENT)
Dept: INTERNAL MEDICINE CLINIC | Age: 60
End: 2025-01-09
Payer: COMMERCIAL

## 2025-01-09 VITALS
OXYGEN SATURATION: 98 % | WEIGHT: 139.7 LBS | DIASTOLIC BLOOD PRESSURE: 78 MMHG | SYSTOLIC BLOOD PRESSURE: 130 MMHG | HEART RATE: 72 BPM | BODY MASS INDEX: 24.94 KG/M2

## 2025-01-09 DIAGNOSIS — M81.6 LOCALIZED OSTEOPOROSIS, UNSPECIFIED PATHOLOGICAL FRACTURE PRESENCE: Primary | ICD-10-CM

## 2025-01-09 LAB — CALCIUM SERPL-MCNC: 9.7 MG/DL (ref 8.6–10.3)

## 2025-01-09 PROCEDURE — 99213 OFFICE O/P EST LOW 20 MIN: CPT | Performed by: INTERNAL MEDICINE

## 2025-01-09 SDOH — ECONOMIC STABILITY: FOOD INSECURITY: WITHIN THE PAST 12 MONTHS, THE FOOD YOU BOUGHT JUST DIDN'T LAST AND YOU DIDN'T HAVE MONEY TO GET MORE.: NEVER TRUE

## 2025-01-09 SDOH — ECONOMIC STABILITY: FOOD INSECURITY: WITHIN THE PAST 12 MONTHS, YOU WORRIED THAT YOUR FOOD WOULD RUN OUT BEFORE YOU GOT MONEY TO BUY MORE.: NEVER TRUE

## 2025-01-09 ASSESSMENT — PATIENT HEALTH QUESTIONNAIRE - PHQ9
SUM OF ALL RESPONSES TO PHQ QUESTIONS 1-9: 0
SUM OF ALL RESPONSES TO PHQ9 QUESTIONS 1 & 2: 0
2. FEELING DOWN, DEPRESSED OR HOPELESS: NOT AT ALL
1. LITTLE INTEREST OR PLEASURE IN DOING THINGS: NOT AT ALL
SUM OF ALL RESPONSES TO PHQ QUESTIONS 1-9: 0

## 2025-01-09 NOTE — PATIENT INSTRUCTIONS
If pth is normal, then lets discuss starting fosamax v recheck dexa in 18-24 months    If abnormal, then referral to ENT    1200 mg calcium from food and supplements

## 2025-01-13 ENCOUNTER — TELEPHONE (OUTPATIENT)
Dept: INTERNAL MEDICINE CLINIC | Age: 60
End: 2025-01-13

## 2025-01-13 RX ORDER — ALENDRONATE SODIUM 70 MG/1
70 TABLET ORAL WEEKLY
COMMUNITY
Start: 2025-01-07

## 2025-01-13 NOTE — TELEPHONE ENCOUNTER
[facility] is calling in for  in regards to fax received in regards to pcp note: \"Please clarify-what is the PTH result?  Is it 59? \". Per lab patient did not get testing done with them but did get it done at [place] .

## 2025-01-14 ENCOUNTER — TELEPHONE (OUTPATIENT)
Dept: ENDOCRINOLOGY | Age: 60
End: 2025-01-14

## 2025-01-17 ENCOUNTER — PATIENT MESSAGE (OUTPATIENT)
Dept: INTERNAL MEDICINE CLINIC | Age: 60
End: 2025-01-17

## 2025-01-17 NOTE — TELEPHONE ENCOUNTER
Sent this message to pt via [email or web address].    For GYN, please advise Dr. Funmilayo Torres, closer to her house. She and I had discussed.

## 2025-01-22 ENCOUNTER — TELEPHONE (OUTPATIENT)
Dept: ENDOCRINOLOGY | Age: 60
End: 2025-01-22

## 2025-02-03 ENCOUNTER — OFFICE VISIT (OUTPATIENT)
Dept: ENDOCRINOLOGY | Age: 60
End: 2025-02-03
Payer: COMMERCIAL

## 2025-02-03 VITALS — WEIGHT: 140.6 LBS | HEIGHT: 63 IN | BODY MASS INDEX: 24.91 KG/M2

## 2025-02-03 DIAGNOSIS — M85.89 OSTEOPENIA OF MULTIPLE SITES: ICD-10-CM

## 2025-02-03 DIAGNOSIS — M89.9 DISEASE OF SKELETAL SYSTEM: Primary | ICD-10-CM

## 2025-02-03 PROCEDURE — 99205 OFFICE O/P NEW HI 60 MIN: CPT | Performed by: INTERNAL MEDICINE

## 2025-02-03 NOTE — PROGRESS NOTES
[facility]  [address] [place] [address] [address] [place] [address]  Phone [phone]  Fax [phone]    NAME:  MATT HARRINGTON  :  1965  CONSULT DATE:    2025  MOST RECENT VISIT:  2025  TODAY'S DATE:  2025    Labs @ [facility] 2024    CONSULTATION REQUESTED BY: Maylin Abebe MD    PROBLEMS.  Low bone density by DXA 2021, lowest T-score -1.5 in the right femoral neck (1/3 radius -2.6)    Family history of osteoporosis, father with hip fracture    , ankle fx (fell off curb)    BMD decreased [id number], lowest T-score -2.0 in the right femoral neck  Natural menopause age 43  Scoliosis, Urena rods   Hypothyroidism  Hypercholesterolemia    CURRENT MANAGEMENT FOR BONE HEALTH/OSTEOPOROSIS.  Calcium, 300 mg from low calcium foods    diet MVI Ca+D other    Calcium 400  600  mg/d   Vitamin D   500 1000 IU/d     25-OH D 58 ng/mL 2019 (desirable is 30-60 ng/mL)  Exercise, [employer] some walking, biking  Pharmacologic therapy: none    PREVIOUS BONE-ACTIVE MEDICATIONS. none    OTHER CURRENT MEDICATIONS (SELECTED): thyroxine 88 mcg/d, rosuvastatin  OTC MEDICATIONS (SELECTED): vitamin C, Preservision    CHIEF COMPLAINT.  “New problem. Urena rods.  Hips and wrist scanned.”    HISTORY OF PRESENT ILLNESS: See problem list for chronic/inactive conditions.  Ms. Harrington is a 59-year-old woman who was found to have low bone density by DXA in 2021.    FOR FULL DETAILS OF FAMILY HISTORY, PAST MEDICAL AND SURGICAL HISTORY, SOCIAL HISTORY, SEE PATIENT QUESTIONNAIRE OF TODAY'S DATE.    FAMILY HISTORY.  Relevant hx in problem list and/or HPI. Otherwise not contributory.  PAST MEDICAL HISTORY.  Noted in health history form.   PAST SURGICAL HISTORY.  Noted in health history form.   SOCIAL HISTORY.  Nonsmoker.  No excessive intake of alcohol, caffeine or sodas. Lives with her .  REVIEW OF SYSTEMS. Maximum adult height 64”.  No significant height

## 2025-02-05 LAB — VITAMIN D 25-HYDROXY: 66.3 NG/ML (ref 30–100)

## 2025-02-06 LAB
CREATINE, URINE: 37.2 MG/DL
CREATININE 24 HOUR UR: 0.97 G/24 HR (ref 0.8–1.8)
SODIUM 24 HOUR URINE: 88 MMOL/24HR (ref 39–258)
SODIUM URINE: 34 MMOL/L
VOLUME: 2600 ML
VOLUME: 2600 ML

## 2025-02-07 LAB
CALCIUM 24 HOUR URINE: 226 MG/24 HR (ref 0–320)
CALCIUM, URINE: 9 MG/DL

## 2025-02-24 ENCOUNTER — TELEPHONE (OUTPATIENT)
Dept: ENDOCRINOLOGY | Age: 60
End: 2025-02-24

## 2025-03-03 RX ORDER — LEVOTHYROXINE SODIUM 88 UG/1
88 TABLET ORAL DAILY
Qty: 90 TABLET | Refills: 0 | Status: SHIPPED | OUTPATIENT
Start: 2025-03-03

## 2025-03-03 RX ORDER — LEVOTHYROXINE SODIUM 88 UG/1
88 TABLET ORAL DAILY
Qty: 90 TABLET | Refills: 1 | OUTPATIENT
Start: 2025-03-03

## 2025-03-20 LAB
ALBUMIN: 4.2 G/DL (ref 3.5–5.7)
ALP BLD-CCNC: 41 U/L (ref 36–125)
ALT SERPL-CCNC: 36 U/L (ref 7–52)
ANION GAP SERPL CALCULATED.3IONS-SCNC: 7 MMOL/L (ref 3–16)
AST SERPL-CCNC: 25 U/L (ref 13–39)
BILIRUB SERPL-MCNC: 1 MG/DL (ref 0–1.5)
BUN BLDV-MCNC: 16 MG/DL (ref 7–25)
CALCIUM SERPL-MCNC: 9.6 MG/DL (ref 8.6–10.3)
CHLORIDE BLD-SCNC: 106 MMOL/L (ref 98–110)
CHOLESTEROL, TOTAL: 161 MG/DL (ref 0–200)
CO2: 30 MMOL/L (ref 21–33)
CREAT SERPL-MCNC: 0.71 MG/DL (ref 0.6–1.3)
ESTIMATED GLOMERULAR FILTRATION RATE CREATININE EQUATION: >90
GLUCOSE BLD-MCNC: 107 MG/DL (ref 70–100)
HBA1C MFR BLD: 5.5 % (ref 4–5.6)
HCT VFR BLD CALC: 40.5 % (ref 35–45)
HDLC SERPL-MCNC: 59 MG/DL (ref 60–92)
HEMOGLOBIN: 13.7 G/DL (ref 11.7–15.5)
LDL CHOLESTEROL: 85 MG/DL
MCH RBC QN AUTO: 29.2 PG (ref 27–33)
MCHC RBC AUTO-ENTMCNC: 33.8 G/DL (ref 32–36)
MCV RBC AUTO: 86.4 FL (ref 80–100)
NON-HDL CHOLESTEROL: 102 MG/DL (ref 0–129)
OSMOLALITY CALCULATION: 298 MOSM/KG (ref 278–305)
PDW BLD-RTO: 13.8 % (ref 11–15)
PLATELET # BLD: 194 10E3/UL (ref 140–400)
PMV BLD AUTO: 10 FL (ref 7.5–11.5)
POTASSIUM SERPL-SCNC: 4.4 MMOL/L (ref 3.5–5.3)
RBC # BLD: 4.69 10E6/UL (ref 3.8–5.1)
SODIUM BLD-SCNC: 143 MMOL/L (ref 133–146)
TOTAL PROTEIN: 6.5 G/DL (ref 6.4–8.9)
TRIGL SERPL-MCNC: 84 MG/DL (ref 10–149)
TSH ULTRASENSITIVE: 1.85 UIU/ML (ref 0.45–4.12)
WBC # BLD: 5.2 10E3/UL (ref 3.8–10.8)

## 2025-03-22 ENCOUNTER — RESULTS FOLLOW-UP (OUTPATIENT)
Dept: INTERNAL MEDICINE CLINIC | Age: 60
End: 2025-03-22

## 2025-03-24 ENCOUNTER — OFFICE VISIT (OUTPATIENT)
Dept: INTERNAL MEDICINE CLINIC | Age: 60
End: 2025-03-24
Payer: COMMERCIAL

## 2025-03-24 VITALS
BODY MASS INDEX: 25.58 KG/M2 | HEART RATE: 73 BPM | SYSTOLIC BLOOD PRESSURE: 110 MMHG | DIASTOLIC BLOOD PRESSURE: 72 MMHG | TEMPERATURE: 97.9 F | OXYGEN SATURATION: 98 % | HEIGHT: 62 IN | WEIGHT: 139 LBS

## 2025-03-24 DIAGNOSIS — Z00.00 ENCOUNTER FOR WELL ADULT EXAM WITHOUT ABNORMAL FINDINGS: Primary | ICD-10-CM

## 2025-03-24 PROCEDURE — 99396 PREV VISIT EST AGE 40-64: CPT | Performed by: INTERNAL MEDICINE

## 2025-03-24 ASSESSMENT — ENCOUNTER SYMPTOMS
SHORTNESS OF BREATH: 0
NAUSEA: 0
COUGH: 0
TROUBLE SWALLOWING: 0
RHINORRHEA: 0
ABDOMINAL PAIN: 0
DIARRHEA: 0
SORE THROAT: 0

## 2025-03-24 NOTE — PROGRESS NOTES
Well Adult Note  Name: Britni Salmon Today’s Date: 3/24/2025   MRN: [id number] Sex: Female   Age: 59 y.o. Ethnicity: Non- / Non    : 1965 Race: White (non-)      Britni Salmon is here for a well adult exam.       Assessment & Plan   Encounter for well adult exam without abnormal findings  Preventive healthcare reviewed.  Advised Prevnar.  Advised her to check with her gastroenterologist regarding the proper timing on colonoscopy follow-up.  She is up-to-date with her dermatologist.  She will be seeing gynecology soon.      Pure hypercholesterolemia  Well-controlled on Crestor  2. Acquired hypothyroidism  Well-controlled on current dose of levothyroxine   3. Localized osteoporosis  Patient saw Dr. Johnson for now will remain on calcium and vitamin D supplements.  She will repeat her DEXA scan in about 2 years.      Return in about 1 year (around 3/24/2026).       Subjective   History:    Overall patient feels well and has no new complaints.  She is compliant with medication.  We reviewed her recent blood work.  Review of Systems   Constitutional:  Negative for fatigue and fever.   HENT:  Negative for rhinorrhea, sore throat and trouble swallowing.    Eyes:  Negative for visual disturbance.   Respiratory:  Negative for cough and shortness of breath.    Cardiovascular:  Negative for chest pain and palpitations.   Gastrointestinal:  Negative for abdominal pain, diarrhea and nausea.   Genitourinary:  Negative for decreased urine volume, dysuria and frequency.   Musculoskeletal:  Negative for arthralgias and myalgias.   Skin:  Negative for rash.   Allergic/Immunologic: Negative for immunocompromised state.   Neurological:  Negative for dizziness, numbness and headaches.   Hematological:  Does not bruise/bleed easily.   Psychiatric/Behavioral:  Negative for dysphoric mood and sleep disturbance. The patient is not nervous/anxious.        Allergies   Allergen Reactions    Meperidine     Codeine

## 2025-03-24 NOTE — PATIENT INSTRUCTIONS
Prevnar 20 here or pharmacy  Talk to Dr Ann about when is next  colon due    Well Visit, Ages 18 to 65: Care Instructions  Well visits can help you stay healthy. Your doctor has checked your overall health and may have suggested ways to take good care of yourself. Your doctor also may have recommended tests. You can help prevent illness with healthy eating, good sleep, vaccinations, regular exercise, and other steps.    Get the tests that you and your doctor decide on. Depending on your age and risks, examples might include screening for diabetes; hepatitis C; HIV; and cervical, breast, lung, and colon cancer. Screening helps find diseases before any symptoms appear.   Eat healthy foods. Choose fruits, vegetables, whole grains, lean protein, and low-fat dairy foods. Limit saturated fat and reduce salt.     Limit alcohol. Men should have no more than 2 drinks a day. Women should have no more than 1. For some people, no alcohol is the best choice.   Exercise. Get at least 30 minutes of exercise on most days of the week. Walking can be a good choice.     Reach and stay at your healthy weight. This will lower your risk for many health problems.   Take care of your mental health. Try to stay connected with friends, family, and community, and find ways to manage stress.     If you're feeling depressed or hopeless, talk to someone. A counselor can help. If you don't have a counselor, talk to your doctor.   Talk to your doctor if you think you may have a problem with alcohol or drug use. This includes prescription medicines, marijuana, and other drugs.     Avoid tobacco and nicotine: Don't smoke, vape, or chew. If you need help quitting, talk to your doctor.   Practice safer sex. Getting tested, using condoms or dental dams, and limiting sex partners can help prevent STIs.     Use birth control if it's important to you to prevent pregnancy. Talk with your doctor about your choices and what might be best for you.

## 2025-03-28 ENCOUNTER — TELEPHONE (OUTPATIENT)
Dept: INTERNAL MEDICINE CLINIC | Age: 60
End: 2025-03-28

## 2025-03-28 RX ORDER — LEVOTHYROXINE SODIUM 88 UG/1
88 TABLET ORAL DAILY
Qty: 90 TABLET | Refills: 3 | Status: SHIPPED | OUTPATIENT
Start: 2025-03-28

## 2025-03-28 NOTE — TELEPHONE ENCOUNTER
Patient comment: Can I please get a year refill please.    Especially with your departure coming up! Thank you!         Last appointment: 3/24/2025  Next appointment: Visit date not found  Return in about 1 year (around 3/24/2026).    Last refill: 3/3/25 qty 90 per DR Sanchez     I know we are only doing 6 mo of refills for your patients however   Patient is not due for appointment until 3/24/26, so   I did pend the script for qty 90 with 3 refills per patient request  For your review     Thanks!